# Patient Record
Sex: FEMALE | URBAN - METROPOLITAN AREA
[De-identification: names, ages, dates, MRNs, and addresses within clinical notes are randomized per-mention and may not be internally consistent; named-entity substitution may affect disease eponyms.]

---

## 2023-08-15 ENCOUNTER — APPOINTMENT (RX ONLY)
Dept: URBAN - METROPOLITAN AREA CLINIC 128 | Facility: CLINIC | Age: 38
Setting detail: DERMATOLOGY
End: 2023-08-15

## 2023-08-15 DIAGNOSIS — L81.8 OTHER SPECIFIED DISORDERS OF PIGMENTATION: ICD-10-CM

## 2023-08-15 PROCEDURE — ? LASER TATTOO REMOVAL

## 2023-08-15 PROCEDURE — ? COUNSELING

## 2023-08-15 ASSESSMENT — LOCATION ZONE DERM: LOCATION ZONE: ARM

## 2023-08-15 ASSESSMENT — LOCATION DETAILED DESCRIPTION DERM: LOCATION DETAILED: RIGHT DISTAL DORSAL FOREARM

## 2023-08-15 ASSESSMENT — LOCATION SIMPLE DESCRIPTION DERM: LOCATION SIMPLE: RIGHT FOREARM

## 2023-08-15 NOTE — PROCEDURE: LASER TATTOO REMOVAL
Laser Type: Q-switched Nd:Yag 1064nm
Treatment Number: 1
Post-Care Instructions: I reviewed with the patient in detail post-care instructions. Patient should apply vaseline twice daily to tattoo and keep it covered with a non-stick adhesive dressing.
Spot Size In Mm: 3
Post-Procedure Text: Vaseline and ice applied. Post care reviewed with patient.
Anesthesia Volume In Cc: 12
Laser Type: Q-switched Nd:Yag 532nm
Price (Use Numbers Only, No Special Characters Or $): 0
Consent: Written consent obtained, risks reviewed including but not limited to crusting, scabbing, blistering, scarring, darker or lighter pigmentary change, systemic reactions, ulceration, incidental hair removal, bruising, and/or incomplete removal.
Pre-Procedure Text: The treatment areas were cleaned and treated with the laser parameters noted above.
Spot Size In Mm: 2
Wavelength Used (Optional - Include Units): [address] [address]
Laser Type: Q-switched Alexandrite 755nm
Wavelength Used (Optional - Include Units): [facility]
Spot Size In Mm: 6
Tattoo Color Treated: Black
Fluence: 3.6
Anesthesia Type: 1% lidocaine with epinephrine
Endpoint Text: Immediate endpoint: skin whitening and pinpoint bleeding.
Tattoo Color Treated: Purple
Detail Level: Detailed

## 2023-11-21 ENCOUNTER — APPOINTMENT (RX ONLY)
Dept: URBAN - METROPOLITAN AREA CLINIC 128 | Facility: CLINIC | Age: 38
Setting detail: DERMATOLOGY
End: 2023-11-21

## 2023-11-21 DIAGNOSIS — L81.8 OTHER SPECIFIED DISORDERS OF PIGMENTATION: ICD-10-CM

## 2023-11-21 PROCEDURE — ? ADDITIONAL NOTES

## 2023-11-21 PROCEDURE — ? LASER TATTOO REMOVAL

## 2023-11-21 PROCEDURE — ? COUNSELING

## 2023-11-21 ASSESSMENT — LOCATION ZONE DERM: LOCATION ZONE: ARM

## 2023-11-21 ASSESSMENT — LOCATION DETAILED DESCRIPTION DERM: LOCATION DETAILED: RIGHT DISTAL DORSAL FOREARM

## 2023-11-21 ASSESSMENT — LOCATION SIMPLE DESCRIPTION DERM: LOCATION SIMPLE: RIGHT FOREARM

## 2023-11-21 NOTE — PROCEDURE: LASER TATTOO REMOVAL
Laser Type: Q-switched Nd:Yag 1064nm
Treatment Number: 2
Post-Care Instructions: I reviewed with the patient in detail post-care instructions. Patient should apply vaseline twice daily to tattoo and keep it covered with a non-stick adhesive dressing.
Spot Size In Mm: 3
Post-Procedure Text: Vaseline and ice applied. Post care reviewed with patient.
Fluence: 1.2
Anesthesia Volume In Cc: 12
Laser Type: Q-switched Nd:Yag 532nm
Price (Use Numbers Only, No Special Characters Or $): 0
Consent: Written consent obtained, risks reviewed including but not limited to crusting, scabbing, blistering, scarring, darker or lighter pigmentary change, systemic reactions, ulceration, incidental hair removal, bruising, and/or incomplete removal.
Pre-Procedure Text: The treatment areas were cleaned and treated with the laser parameters noted above.
Wavelength Used (Optional - Include Units): [address] [address]
Laser Type: Q-switched Alexandrite 755nm
Wavelength Used (Optional - Include Units): [address] [facility]
Spot Size In Mm: 4
Tattoo Color Treated: Black
Fluence: 5.5
Anesthesia Type: 1% lidocaine with epinephrine
Endpoint Text: Immediate endpoint: skin whitening and pinpoint bleeding.
Tattoo Color Treated: Purple
Detail Level: Detailed

## 2023-11-21 NOTE — PROCEDURE: ADDITIONAL NOTES
Additional Notes: After waiting 20 minutes a second round of treatment was administered today.
Render Risk Assessment In Note?: no
Detail Level: Simple